# Patient Record
Sex: MALE | Race: WHITE | NOT HISPANIC OR LATINO | Employment: OTHER | URBAN - METROPOLITAN AREA
[De-identification: names, ages, dates, MRNs, and addresses within clinical notes are randomized per-mention and may not be internally consistent; named-entity substitution may affect disease eponyms.]

---

## 2018-05-17 LAB — HBA1C MFR BLD HPLC: 6 %

## 2018-05-23 ENCOUNTER — OFFICE VISIT (OUTPATIENT)
Dept: ENDOCRINOLOGY | Facility: HOSPITAL | Age: 83
End: 2018-05-23
Payer: COMMERCIAL

## 2018-05-23 VITALS
SYSTOLIC BLOOD PRESSURE: 122 MMHG | WEIGHT: 222.4 LBS | HEIGHT: 71 IN | HEART RATE: 66 BPM | DIASTOLIC BLOOD PRESSURE: 80 MMHG | BODY MASS INDEX: 31.14 KG/M2

## 2018-05-23 DIAGNOSIS — E78.5 HYPERLIPIDEMIA, UNSPECIFIED HYPERLIPIDEMIA TYPE: ICD-10-CM

## 2018-05-23 DIAGNOSIS — E03.9 HYPOTHYROIDISM, UNSPECIFIED TYPE: Primary | ICD-10-CM

## 2018-05-23 DIAGNOSIS — R73.03 PREDIABETES: ICD-10-CM

## 2018-05-23 DIAGNOSIS — I10 HYPERTENSION, UNSPECIFIED TYPE: ICD-10-CM

## 2018-05-23 PROCEDURE — 99204 OFFICE O/P NEW MOD 45 MIN: CPT | Performed by: INTERNAL MEDICINE

## 2018-05-23 RX ORDER — HYDROCHLOROTHIAZIDE 25 MG/1
25 TABLET ORAL DAILY
COMMUNITY

## 2018-05-23 RX ORDER — FINASTERIDE 5 MG/1
5 TABLET, FILM COATED ORAL DAILY
Refills: 0 | COMMUNITY
Start: 2018-03-08

## 2018-05-23 RX ORDER — VERAPAMIL HYDROCHLORIDE 120 MG/1
60 TABLET, FILM COATED ORAL DAILY
Refills: 0 | COMMUNITY
Start: 2018-05-04

## 2018-05-23 RX ORDER — COLCHICINE 0.6 MG/1
0.6 TABLET ORAL DAILY
Refills: 0 | COMMUNITY
Start: 2018-04-27

## 2018-05-23 RX ORDER — PRAVASTATIN SODIUM 20 MG
20 TABLET ORAL DAILY
Refills: 0 | COMMUNITY
Start: 2018-03-09

## 2018-05-23 RX ORDER — MELATONIN
2000 DAILY
COMMUNITY

## 2018-05-23 RX ORDER — TIMOLOL MALEATE 5 MG/ML
1 SOLUTION/ DROPS OPHTHALMIC 2 TIMES DAILY
Refills: 1 | COMMUNITY
Start: 2018-03-01

## 2018-05-23 RX ORDER — DOXAZOSIN MESYLATE 4 MG/1
8 TABLET ORAL
Refills: 0 | COMMUNITY
Start: 2018-05-08

## 2018-05-23 NOTE — LETTER
May 23, 2018     Ira Rochaanastacia  [address]    Patient: Elayne Gilford   YOB: 1930   Date of Visit: 5/23/2018       Dear Dr Khanh Waters: Thank you for referring Elayne Gilford to me for evaluation  Below are my notes for this consultation  If you have questions, please do not hesitate to call me  I look forward to following your patient along with you  Sincerely,        Chris Galvez MD        CC: MD Chris Pérez MD  5/23/2018 12:39 PM  Sign at close encounter  5/23/2018    Assessment/Plan      Diagnoses and all orders for this visit:    Hypothyroidism, unspecified type  -     T4, free Lab Collect; Future  -     TSH, 3rd generation Lab Collect; Future    Hypertension, unspecified type    Prediabetes  -     HEMOGLOBIN A1C W/ EAG ESTIMATION Lab Collect; Future  -     Comprehensive metabolic panel Lab Collect; Future  -     Microalbumin / creatinine urine ratio Lab Collect; Future  -     T4, free Lab Collect; Future  -     TSH, 3rd generation Lab Collect; Future    Hyperlipidemia, unspecified hyperlipidemia type    Other orders  -     verapamil (CALAN) 120 mg tablet; Take 60 mg by mouth daily    -     pravastatin (PRAVACHOL) 20 mg tablet; Take 20 mg by mouth daily  -     doxazosin (CARDURA) 4 mg tablet; Take 8 mg by mouth daily at bedtime    -     colchicine (COLCRYS) 0 6 mg tablet; Take 0 6 mg by mouth daily    -     finasteride (PROSCAR) 5 mg tablet; Take 5 mg by mouth daily    -     timolol (TIMOPTIC) 0 5 % ophthalmic solution;   -     cholecalciferol (VITAMIN D3) 1,000 units tablet; Take 2,000 Units by mouth daily  -     hydrochlorothiazide (HYDRODIURIL) 25 mg tablet; Take 25 mg by mouth daily prn         Assessment/Plan:  1  Prediabetes  His most recent hemoglobin A1c of 6% does show he has prediabetes and not diabetes  I reassured him about regarding this    He needs to continue to work on limiting and eliminating sweets and carbohydrates in his diet to help him lose some weight  I will follow the hemoglobin A1c over time  He can continue to work on diet alone for now  2   Hypothyroidism  His TSH is 7 5 signifying biochemical hypothyroidism  He is antibody negative  He does not have a wish to treat this as yet did see has no symptoms  It is reasonable to wait until his TSH is over 10 as long as his free T4 is normal before treating this hypothyroidism  3   Hyperlipidemia  He is following with Dr Ronnie Rodriguez for that and is on pravastatin 20 mg daily  4   Hypertension  His blood pressure is currently under good control on his current dose of Cardura and Calan  I will have him follow up in 6 months with preceding hemoglobin A1c, CMP, TSH, free T4, and urine microalbumin to creatinine ratio  CC: Diabetes Consult    History of Present Illness     HPI: Megan Mcdonald is a 80y o  year old male with type 2 diabetes in recent years  His blood sugars have been elevated and no diagnosis of diabetes as yet  He is on diet at home  He denies any polyuria, polydipsia, polyphagia, and blurry vision  He admits to once or twice a night nocturia  He denies chest pain or shortness of breath  He denies numbness or tingling of the feet  He denies neuropathy, nephropathy, retinopathy, heart attack, stroke and claudication but does admit to none  Hypoglycemic episodes: No never  H/o of hypoglycemia causing hospitalization or intervention such as glucagon injection  or ambulance call  No   Hypoglycemia symptoms: never  had one  Treatment of hypoglycemia: drinks juice  Glucagon:No   Medic alert tag: recommended,Yes  The patient's last eye exam was in about 6 months a go  Due next month for follow up  No retinopathy in eyes     The patient's last foot exam was in regularly comes to the home to cut toenails  Last A1C was 6 0% on 5/17/18  Blood Sugar/Glucometer/Pump/CGM review: not done    Before breakfast: Before lunch:   Before dinner:   Bedtime:     Review of Systems   Constitutional: Negative for diaphoresis, fatigue and unexpected weight change  Trying to lose weight  HENT: Negative for hearing loss and tinnitus  Eyes: Negative for visual disturbance  Wears glasses for bifocals  Respiratory: Negative for chest tightness and shortness of breath  Cardiovascular: Negative for chest pain, palpitations and leg swelling  Saw Dr Thien Hair 2-3 weeks ago  Had recent holter monitor  To get a Echo soon  Gastrointestinal: Negative for abdominal pain, constipation, diarrhea and nausea  Endocrine: Negative for cold intolerance, heat intolerance, polydipsia, polyphagia and polyuria  Had polyuria in the past, but new medicine from urology has helped  Nocturia 1-2 times a night  Musculoskeletal: Positive for arthralgias  Negative for back pain  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, Mantle cell  Bilateral knee pain  Skin: Negative for rash and wound  Neurological: Negative for dizziness, tremors, light-headedness, numbness and headaches  Psychiatric/Behavioral: Negative for dysphoric mood and sleep disturbance  The patient is not nervous/anxious  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         Historical Information   Past Medical History:   Diagnosis Date    BPH (benign prostatic hyperplasia)     Gout     Lymphoma ([facility] [address] )     mantle cell     Past Surgical History:   Procedure Laterality Date    CARPAL TUNNEL RELEASE Bilateral     INGUINAL HERNIA REPAIR Bilateral     X 3, some with mesh placement    PORTACATH PLACEMENT Left      Social History   History   Alcohol Use No     History   Drug Use No     History   Smoking Status    Former Smoker    Packs/day: 1 00    Years: 20 00    Types: Cigarettes    Quit date: 1970   Smokeless Tobacco    Never Used     Family History:   Family History   Problem Relation Age of Onset    Heart disease Mother     Heart attack Mother     Emphysema Father     Obesity Sister     No Known Problems Daughter     No Known Problems Daughter        Meds/Allergies   Current Outpatient Prescriptions   Medication Sig Dispense Refill    cholecalciferol (VITAMIN D3) 1,000 units tablet Take 2,000 Units by mouth daily      colchicine (COLCRYS) 0 6 mg tablet Take 0 6 mg by mouth daily    0    doxazosin (CARDURA) 4 mg tablet Take 8 mg by mouth daily at bedtime    0    finasteride (PROSCAR) 5 mg tablet Take 5 mg by mouth daily    0    hydrochlorothiazide (HYDRODIURIL) 25 mg tablet Take 25 mg by mouth daily prn       pravastatin (PRAVACHOL) 20 mg tablet Take 20 mg by mouth daily  0    timolol (TIMOPTIC) 0 5 % ophthalmic solution   1    verapamil (CALAN) 120 mg tablet Take 60 mg by mouth daily    0     No current facility-administered medications for this visit  Allergies   Allergen Reactions    Neulasta [Pegfilgrastim]     Rituxan [Rituximab]        Objective   Vitals: Blood pressure 122/80, pulse 66, height 5' 10 5" (1 791 m), weight 101 kg (222 lb 6 4 oz)  Invasive Devices          No matching active lines, drains, or airways          Physical Exam   Constitutional: He is oriented to person, place, and time  He appears well-developed and well-nourished  HENT:   Head: Normocephalic and atraumatic  Eyes: Conjunctivae and EOM are normal  Pupils are equal, round, and reactive to light  No lid lag, stare, proptosis, or periorbital edema  Neck: Normal range of motion  Neck supple  No thyromegaly present  No carotid bruits  Cardiovascular: Normal rate, regular rhythm, normal heart sounds and intact distal pulses  No murmur heard  Pulmonary/Chest: Effort normal and breath sounds normal  He has no wheezes  Abdominal: Soft  Bowel sounds are normal  There is no tenderness  Musculoskeletal: Normal range of motion  He exhibits no edema or deformity  2+ edema of the bilateral lower extremities    No tremor of the outstretched hands  No spinous process tenderness  No CVAT  Lymphadenopathy:     He has no cervical adenopathy  Neurological: He is alert and oriented to person, place, and time  He has normal reflexes  Skin: Skin is warm and dry  No rash noted  No erythema  Vitals reviewed  The history was obtained from the review of the chart and from the patient  Lab Results:   Blood work on 05/17/2018 showed hemoglobin A1c of 6%  CMP showed a glucose of 114 fasting but was otherwise normal   TSH is elevated at 7 5  Free T4 is normal at 1 04  Thyroid peroxidase antibodies were normal at 27  No results found for this or any previous visit (from the past 06151 hour(s))        Future Appointments  Date Time Provider Radha Silva   11/27/2018 11:15 AM WILBER Degroot [facility]

## 2018-05-23 NOTE — PROGRESS NOTES
5/23/2018    Assessment/Plan      Diagnoses and all orders for this visit:    Hypothyroidism, unspecified type  -     T4, free Lab Collect; Future  -     TSH, 3rd generation Lab Collect; Future    Hypertension, unspecified type    Prediabetes  -     HEMOGLOBIN A1C W/ EAG ESTIMATION Lab Collect; Future  -     Comprehensive metabolic panel Lab Collect; Future  -     Microalbumin / creatinine urine ratio Lab Collect; Future  -     T4, free Lab Collect; Future  -     TSH, 3rd generation Lab Collect; Future    Hyperlipidemia, unspecified hyperlipidemia type    Other orders  -     verapamil (CALAN) 120 mg tablet; Take 60 mg by mouth daily    -     pravastatin (PRAVACHOL) 20 mg tablet; Take 20 mg by mouth daily  -     doxazosin (CARDURA) 4 mg tablet; Take 8 mg by mouth daily at bedtime    -     colchicine (COLCRYS) 0 6 mg tablet; Take 0 6 mg by mouth daily    -     finasteride (PROSCAR) 5 mg tablet; Take 5 mg by mouth daily    -     timolol (TIMOPTIC) 0 5 % ophthalmic solution;   -     cholecalciferol (VITAMIN D3) 1,000 units tablet; Take 2,000 Units by mouth daily  -     hydrochlorothiazide (HYDRODIURIL) 25 mg tablet; Take 25 mg by mouth daily prn         Assessment/Plan:  1  Prediabetes  His most recent hemoglobin A1c of 6% does show he has prediabetes and not diabetes  I reassured him about regarding this  He needs to continue to work on limiting and eliminating sweets and carbohydrates in his diet to help him lose some weight  I will follow the hemoglobin A1c over time  He can continue to work on diet alone for now  2   Hypothyroidism  His TSH is 7 5 signifying biochemical hypothyroidism  He is antibody negative  He does not have a wish to treat this as yet did see has no symptoms  It is reasonable to wait until his TSH is over 10 as long as his free T4 is normal before treating this hypothyroidism  3   Hyperlipidemia  He is following with Dr Bonnie Del Valle for that and is on pravastatin 20 mg daily    4  Hypertension  His blood pressure is currently under good control on his current dose of Cardura and Calan  I will have him follow up in 6 months with preceding hemoglobin A1c, CMP, TSH, free T4, and urine microalbumin to creatinine ratio  CC: Diabetes Consult    History of Present Illness     HPI: Elva Cardenas is a 80y o  year old male with type 2 diabetes in recent years  His blood sugars have been elevated and no diagnosis of diabetes as yet  He is on diet at home  He denies any polyuria, polydipsia, polyphagia, and blurry vision  He admits to once or twice a night nocturia  He denies chest pain or shortness of breath  He denies numbness or tingling of the feet  He denies neuropathy, nephropathy, retinopathy, heart attack, stroke and claudication but does admit to none  Hypoglycemic episodes: No never  H/o of hypoglycemia causing hospitalization or intervention such as glucagon injection  or ambulance call  No   Hypoglycemia symptoms: never  had one  Treatment of hypoglycemia: drinks juice  Glucagon:No   Medic alert tag: recommended,Yes  The patient's last eye exam was in about 6 months a go  Due next month for follow up  No retinopathy in eyes     The patient's last foot exam was in regularly comes to the home to cut toenails  Last A1C was 6 0% on 5/17/18  Blood Sugar/Glucometer/Pump/CGM review: not done  Before breakfast:   Before lunch:   Before dinner:   Bedtime:     Review of Systems   Constitutional: Negative for diaphoresis, fatigue and unexpected weight change  Trying to lose weight  HENT: Negative for hearing loss and tinnitus  Eyes: Negative for visual disturbance  Wears glasses for bifocals  Respiratory: Negative for chest tightness and shortness of breath  Cardiovascular: Negative for chest pain, palpitations and leg swelling  Saw Dr Maribell Lee 2-3 weeks ago  Had recent holter monitor  To get a Echo soon     Gastrointestinal: Negative for abdominal pain, constipation, diarrhea and nausea  Endocrine: Negative for cold intolerance, heat intolerance, polydipsia, polyphagia and polyuria  Had polyuria in the past, but new medicine from urology has helped  Nocturia 1-2 times a night  Musculoskeletal: Positive for arthralgias  Negative for back pain  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, Mantle cell  Bilateral knee pain  Skin: Negative for rash and wound  Neurological: Negative for dizziness, tremors, light-headedness, numbness and headaches  Psychiatric/Behavioral: Negative for dysphoric mood and sleep disturbance  The patient is not nervous/anxious  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         Historical Information   Past Medical History:   Diagnosis Date    BPH (benign prostatic hyperplasia)     Gout     Lymphoma ([facility] [address] )     mantle cell     Past Surgical History:   Procedure Laterality Date    CARPAL TUNNEL RELEASE Bilateral     INGUINAL HERNIA REPAIR Bilateral     X 3, some with mesh placement    PORTACATH PLACEMENT Left      Social History   History   Alcohol Use No     History   Drug Use No     History   Smoking Status    Former Smoker    Packs/day: 1 00    Years: 20 00    Types: Cigarettes    Quit date: 1970   Smokeless Tobacco    Never Used     Family History:   Family History   Problem Relation Age of Onset    Heart disease Mother     Heart attack Mother     Emphysema Father     Obesity Sister     No Known Problems Daughter     No Known Problems Daughter        Meds/Allergies   Current Outpatient Prescriptions   Medication Sig Dispense Refill    cholecalciferol (VITAMIN D3) 1,000 units tablet Take 2,000 Units by mouth daily      colchicine (COLCRYS) 0 6 mg tablet Take 0 6 mg by mouth daily    0    doxazosin (CARDURA) 4 mg tablet Take 8 mg by mouth daily at bedtime    0    finasteride (PROSCAR) 5 mg tablet Take 5 mg by mouth daily    0    hydrochlorothiazide (HYDRODIURIL) 25 mg tablet Take 25 mg by mouth daily prn       pravastatin (PRAVACHOL) 20 mg tablet Take 20 mg by mouth daily  0    timolol (TIMOPTIC) 0 5 % ophthalmic solution   1    verapamil (CALAN) 120 mg tablet Take 60 mg by mouth daily    0     No current facility-administered medications for this visit  Allergies   Allergen Reactions    Neulasta [Pegfilgrastim]     Rituxan [Rituximab]        Objective   Vitals: Blood pressure 122/80, pulse 66, height 5' 10 5" (1 791 m), weight 101 kg (222 lb 6 4 oz)  Invasive Devices          No matching active lines, drains, or airways          Physical Exam   Constitutional: He is oriented to person, place, and time  He appears well-developed and well-nourished  HENT:   Head: Normocephalic and atraumatic  Eyes: Conjunctivae and EOM are normal  Pupils are equal, round, and reactive to light  No lid lag, stare, proptosis, or periorbital edema  Neck: Normal range of motion  Neck supple  No thyromegaly present  No carotid bruits  Cardiovascular: Normal rate, regular rhythm, normal heart sounds and intact distal pulses  No murmur heard  Pulmonary/Chest: Effort normal and breath sounds normal  He has no wheezes  Abdominal: Soft  Bowel sounds are normal  There is no tenderness  Musculoskeletal: Normal range of motion  He exhibits no edema or deformity  2+ edema of the bilateral lower extremities  No tremor of the outstretched hands  No spinous process tenderness  No CVAT  Lymphadenopathy:     He has no cervical adenopathy  Neurological: He is alert and oriented to person, place, and time  He has normal reflexes  Skin: Skin is warm and dry  No rash noted  No erythema  Vitals reviewed  The history was obtained from the review of the chart and from the patient  Lab Results:   Blood work on 05/17/2018 showed hemoglobin A1c of 6%  CMP showed a glucose of 114 fasting but was otherwise normal   TSH is elevated at 7 5  Free T4 is normal at 1 04    Thyroid peroxidase antibodies were normal at 27  No results found for this or any previous visit (from the past 85538 hour(s))        Future Appointments  Date Time Provider Rdaha Silva   11/27/2018 11:15 AM WILBER Sapp [facility]

## 2018-05-23 NOTE — PATIENT INSTRUCTIONS
Hgba1c 6 0% which shows Prediabetes, not diabetes, but need to limit the sweets  Thyroid is a bit underactive  You can treat it now or wait until level over 10  Follow up in 6 months with blood work  Hypothyroidism   WHAT YOU NEED TO KNOW:   What is hypothyroidism? Hypothyroidism is a condition that develops when the thyroid gland does not make enough thyroid hormone  Thyroid hormones help control body temperature, heart rate, growth, and weight  What causes hypothyroidism? If you have a family member with hypothyroidism, your risk is increased  Any of the following can cause hypothyroidism:  · Autoimmune disease, such as inflammation of your thyroid, or Hashimoto disease    · Surgery, radiation therapy, or medicines such as lithium, sedatives, or narcotics    · Thyroid cancer or viral infection    · Low iodine levels  What are the signs and symptoms of hypothyroidism? The signs and symptoms may develop slowly, sometimes over several years  · Exhaustion    · Sensitivity to cold    · Headaches or decreased concentration    · Muscle aches or weakness    · Constipation     · Dry, flaky skin or brittle nails    · Thinning hair    · Heavy or irregular monthly periods    · Depression or irritability  How is hypothyroidism diagnosed? Your healthcare provider will ask about your symptoms and what medicines you take  He will ask about your medical history and if anyone in your family has hypothyroidism  A blood test will show your thyroid hormone level  How is hypothyroidism treated? Thyroid hormone replacement medicine may bring your thyroid hormone level back to normal  Ask your healthcare provider for more information on other medicines you may need  Call 911 for any of the following:   · You have sudden chest pain or shortness of breath  · You have a seizure  · You feel like you are going to faint  When should I seek immediate care? · You have diarrhea, tremors, or trouble sleeping      · Your legs, ankles, or feet are swollen  When should I contact my healthcare provider? · You have a fever  · You have chills, a cough, or feel weak and achy  · You have pain and swelling in your muscles and joints  · Your skin is itchy, swollen, or you have a rash  · Your signs and symptoms return or get worse, even after treatment  · You have questions or concerns about your condition or care  CARE AGREEMENT:   You have the right to help plan your care  Learn about your health condition and how it may be treated  Discuss treatment options with your caregivers to decide what care you want to receive  You always have the right to refuse treatment  The above information is an  only  It is not intended as medical advice for individual conditions or treatments  Talk to your doctor, nurse or pharmacist before following any medical regimen to see if it is safe and effective for you  © 2017 [address] [employer] Information is for End User's use only and may not be sold, redistributed or otherwise used for commercial purposes  All illustrations and images included in CareNotes® are the copyrighted property of [employer]  or [address]

## 2018-11-22 LAB
ALBUMIN SERPL-MCNC: 3.9 G/DL (ref 3.5–4.7)
ALBUMIN/CREAT UR: 452.1 MG/G CREAT (ref 0–30)
ALBUMIN/GLOB SERPL: 1.3 {RATIO} (ref 1.2–2.2)
ALP SERPL-CCNC: 48 IU/L (ref 39–117)
ALT SERPL-CCNC: 24 IU/L (ref 0–44)
AST SERPL-CCNC: 26 IU/L (ref 0–40)
BILIRUB SERPL-MCNC: 1.1 MG/DL (ref 0–1.2)
BUN SERPL-MCNC: 15 MG/DL (ref 8–27)
BUN/CREAT SERPL: 14 (ref 10–24)
CALCIUM SERPL-MCNC: 9.4 MG/DL (ref 8.6–10.2)
CHLORIDE SERPL-SCNC: 99 MMOL/L (ref 96–106)
CO2 SERPL-SCNC: 27 MMOL/L (ref 20–29)
CREAT SERPL-MCNC: 1.08 MG/DL (ref 0.76–1.27)
CREAT UR-MCNC: 60.1 MG/DL
EST. AVERAGE GLUCOSE BLD GHB EST-MCNC: 134 MG/DL
GLOBULIN SER-MCNC: 3.1 G/DL (ref 1.5–4.5)
GLUCOSE SERPL-MCNC: 108 MG/DL (ref 65–99)
HBA1C MFR BLD: 6.3 % (ref 4.8–5.6)
LABCORP COMMENT: NORMAL
MICROALBUMIN UR-MCNC: 271.7 UG/ML
POTASSIUM SERPL-SCNC: 3.8 MMOL/L (ref 3.5–5.2)
PROT SERPL-MCNC: 7 G/DL (ref 6–8.5)
SL AMB EGFR AFRICAN AMERICAN: 71 ML/MIN/1.73
SL AMB EGFR NON AFRICAN AMERICAN: 61 ML/MIN/1.73
SODIUM SERPL-SCNC: 142 MMOL/L (ref 134–144)
T4 FREE SERPL-MCNC: 1.08 NG/DL (ref 0.82–1.77)
TSH SERPL DL<=0.005 MIU/L-ACNC: 7.13 UIU/ML (ref 0.45–4.5)

## 2018-12-03 ENCOUNTER — OFFICE VISIT (OUTPATIENT)
Dept: ENDOCRINOLOGY | Facility: HOSPITAL | Age: 83
End: 2018-12-03
Payer: COMMERCIAL

## 2018-12-03 VITALS
HEIGHT: 71 IN | SYSTOLIC BLOOD PRESSURE: 146 MMHG | BODY MASS INDEX: 29.82 KG/M2 | DIASTOLIC BLOOD PRESSURE: 80 MMHG | HEART RATE: 70 BPM | WEIGHT: 213 LBS

## 2018-12-03 DIAGNOSIS — E03.9 ACQUIRED HYPOTHYROIDISM: ICD-10-CM

## 2018-12-03 DIAGNOSIS — R73.03 PREDIABETES: Primary | ICD-10-CM

## 2018-12-03 PROCEDURE — 99213 OFFICE O/P EST LOW 20 MIN: CPT | Performed by: INTERNAL MEDICINE

## 2018-12-03 RX ORDER — GABAPENTIN 300 MG/1
100 CAPSULE ORAL
COMMUNITY

## 2018-12-03 RX ORDER — PREDNISOLONE ACETATE 10 MG/ML
1 SUSPENSION/ DROPS OPHTHALMIC 2 TIMES DAILY
COMMUNITY

## 2018-12-03 RX ORDER — GABAPENTIN 100 MG/1
100 CAPSULE ORAL 2 TIMES DAILY
COMMUNITY

## 2018-12-03 NOTE — PROGRESS NOTES
12/3/2018    Assessment/Plan      Diagnoses and all orders for this visit:    Prediabetes  -     HEMOGLOBIN A1C W/ EAG ESTIMATION Lab Collect; Future  -     Comprehensive metabolic panel Lab Collect; Future  -     Microalbumin / creatinine urine ratio Lab Collect; Future    Acquired hypothyroidism  -     TSH, 3rd generation Lab Collect; Future  -     T4, free Lab Collect; Future  -     Thyroid Antibodies Panel Lab Collect; Future    Other orders  -     apixaban (ELIQUIS) 2 5 mg; Take by mouth 2 (two) times a day  -     prednisoLONE acetate (PRED FORTE) 1 % ophthalmic suspension; Administer 1 drop into the left eye 2 (two) times a day  -     Brinzolamide-Brimonidine (SIMBRINZA) 1-0 2 % SUSP; Apply 1 drop to eye Left eye 4 times a day  -     Artificial Tear Ointment (EYE LUBRICANT OP); Apply 1 drop to eye As needed in left eye  -     immune globulin, human, (GAMMAGARD) 5 g; Infuse into a venous catheter Every 4 weeks  -     gabapentin (NEURONTIN) 100 mg capsule; Take 100 mg by mouth 2 (two) times a day Taken in am and in the afternoon  -     gabapentin (NEURONTIN) 300 mg capsule; Take 100 mg by mouth daily at bedtime        Assessment/Plan:  1  Subclinical Hypothyroidism  His most recent TSH is still slightly above normal at 7 1  This is slightly improved from last visit  He is essentially asymptomatic other than some mild constipation  At this point, he does not want to start any new medicines and it is not absolutely necessary to start thyroid hormone until his TSH is over 10  His recent onset of atrial fibrillation is not related to his elevated TSH is atrial fibrillation is associated with hyperthyroidism not hypothyroidism  I reassured him and his daughter that he does not need to start thyroid hormone we can continue to followed over time  2   Prediabetes    His hemoglobin A1c has continued to climb and is now up to 6 3% some this may be as a result of his recent shingles episode and symptoms and treatments thereof  He also has had an elevation microalbuminuria which is not typical for people with prediabetes  He may need an ACE-inhibitor added but I will repeat his urine microalbumin next visit as his creatinine is stable and make that decision then  I did talk with him again at length and his daughter about decreasing the sweets in his diet and carbohydrates  I have asked him to follow up in 6 months with preceding hemoglobin A1c, CMP, TSH, and free T4 along with urine microalbumin to creatinine ratio  CC: Hypothyroid and Prediabetes Followup    History of Present Illness     HPI: Geovanni Keita is a 80y o  year old male with history of hypothyroidism and prediabetes  He has hypothyroidism with mildly elevated TSH is and has decided not to treat this if he does not absolutely have to  He denies heat or cold intolerance, diarrhea, palpitation, tremors, anxiety or depression  He denies fatigue  He has some constipation  He has had more stress on himself over the last 6 months with his wife in place insisted living and his recent shingles outbreak which was difficult to treat  He has actually lost 9 lb since last visit  He has some dry patches of his skin and winter dry skin  He denies brittle nails  He has no diplopia  He has no difficulties with swallowing or compressive thyroid symptoms  Had shingles late June 2018 on left face with eye involvement  Still some symptoms on the left face and blurry vision  Now off antiviral    He is still on gabapentin  Saw DR Macho Ramirez last week  Now intermittent a fib  Now on eliquis  May need cardioversion  He says his diet is good and had lost weight  Has given up candy bars  He denies polyuria, polydipsia, polyphagia  He has once a night nocturia  He denies numbness or tingling of the feet  He denies blurry vision  He denies chest pain or shortness of breath  Review of Systems   Constitutional: Positive for unexpected weight change  Negative for fatigue  9 lbs weight loss since may 2018  HENT: Negative for trouble swallowing  Eyes: Negative for visual disturbance  No diplopia  Respiratory: Negative for chest tightness and shortness of breath  Cardiovascular: Negative for chest pain and palpitations  Gastrointestinal: Positive for constipation  Negative for abdominal pain, diarrhea and nausea  A bit on the constipated side  Endocrine: Negative for cold intolerance, heat intolerance, polydipsia, polyphagia and polyuria  Nocturia once a night  Skin: Negative for wound  Winter dry skin and dry skin patches  No brittle nails or hair loss  Sees podiatry regularly  Neurological: Negative for tremors and numbness  Psychiatric/Behavioral: Negative for dysphoric mood and sleep disturbance  The patient is not nervous/anxious  Wife in assisted living at present, stress with placing her in June 2018  Silvia Paz        Historical Information   Past Medical History:   Diagnosis Date    BPH (benign prostatic hyperplasia)     Gout     Lymphoma (HCC)     mantle cell    Paroxysmal atrial fibrillation ([address] )     Shingles 06/2018    left face and eye      Past Surgical History:   Procedure Laterality Date    CARPAL TUNNEL RELEASE Bilateral     INGUINAL HERNIA REPAIR Bilateral     X 3, some with mesh placement    PORTACATH PLACEMENT Left      Social History   History   Alcohol Use No     History   Drug Use No     History   Smoking Status    Former Smoker    Packs/day: 1 00    Years: 20 00    Types: Cigarettes    Quit date: 1970   Smokeless Tobacco    Never Used     Family History:   Family History   Problem Relation Age of Onset    Heart disease Mother     Heart attack Mother     Emphysema Father     Obesity Sister     No Known Problems Daughter     No Known Problems Daughter        Meds/Allergies   Current Outpatient Prescriptions   Medication Sig Dispense Refill    apixaban (ELIQUIS) 2 5 mg Take by mouth 2 (two) times a day      Artificial Tear Ointment (EYE LUBRICANT OP) Apply 1 drop to eye As needed in left eye      Brinzolamide-Brimonidine (SIMBRINZA) 1-0 2 % SUSP Apply 1 drop to eye Left eye 4 times a day      cholecalciferol (VITAMIN D3) 1,000 units tablet Take 2,000 Units by mouth daily      colchicine (COLCRYS) 0 6 mg tablet Take 0 6 mg by mouth daily    0    doxazosin (CARDURA) 4 mg tablet Take 8 mg by mouth daily at bedtime    0    finasteride (PROSCAR) 5 mg tablet Take 5 mg by mouth daily    0    gabapentin (NEURONTIN) 100 mg capsule Take 100 mg by mouth 2 (two) times a day Taken in am and in the afternoon      gabapentin (NEURONTIN) 300 mg capsule Take 100 mg by mouth daily at bedtime      hydrochlorothiazide (HYDRODIURIL) 25 mg tablet Take 25 mg by mouth daily prn       immune globulin, human, (GAMMAGARD) 5 g Infuse into a venous catheter Every 4 weeks      pravastatin (PRAVACHOL) 20 mg tablet Take 20 mg by mouth daily  0    prednisoLONE acetate (PRED FORTE) 1 % ophthalmic suspension Administer 1 drop into the left eye 2 (two) times a day      timolol (TIMOPTIC) 0 5 % ophthalmic solution Administer 1 drop to both eyes 2 (two) times a day    1    verapamil (CALAN) 120 mg tablet Take 60 mg by mouth daily    0     No current facility-administered medications for this visit  Allergies   Allergen Reactions    Neulasta [Pegfilgrastim]     Rituxan [Rituximab]        Objective   Vitals: Blood pressure 146/80, pulse 70, height 5' 10 5" (1 791 m), weight 96 6 kg (213 lb)  Invasive Devices          No matching active lines, drains, or airways          Physical Exam   Constitutional: He is oriented to person, place, and time  He appears well-developed and well-nourished  HENT:   Head: Normocephalic and atraumatic  Eyes: Pupils are equal, round, and reactive to light  Conjunctivae and EOM are normal    No lid lag, stare, proptosis, or periorbital edema     Neck: Normal range of motion  Neck supple  No thyromegaly present  Thyroid normal in size  No palpable thyroid nodules  No bruits over the carotids  Cardiovascular: Normal rate, regular rhythm and normal heart sounds  No murmur heard  Pulmonary/Chest: Effort normal and breath sounds normal  He has no wheezes  Musculoskeletal: Normal range of motion  He exhibits edema  He exhibits no deformity  1+ edema of the bilateral lower extremities  No tremor of the outstretched hands  Lymphadenopathy:     He has no cervical adenopathy  Neurological: He is alert and oriented to person, place, and time  He has normal reflexes  Skin: Skin is warm and dry  No rash noted  No erythema  Vitals reviewed  The history was obtained from the review of the chart and from the patient and daughter  Lab Results:       Recent Results (from the past 91682 hour(s))   Comprehensive metabolic panel    Collection Time: 11/21/18 11:49 AM   Result Value Ref Range    Glucose, Random 108 (H) 65 - 99 mg/dL    BUN 15 8 - 27 mg/dL    Creatinine 1 08 0 76 - 1 27 mg/dL    eGFR Non  61 >59 mL/min/1 73    SL AMB EGFR AFRICAN AMERICAN 71 >59 mL/min/1 73    SL AMB BUN/CREATININE RATIO 14 10 - 24    Sodium 142 134 - 144 mmol/L    Potassium 3 8 3 5 - 5 2 mmol/L    Chloride 99 96 - 106 mmol/L    CO2 27 20 - 29 mmol/L    CALCIUM 9 4 8 6 - 10 2 mg/dL    SL AMB PROTEIN, TOTAL 7 0 6 0 - 8 5 g/dL    Albumin 3 9 3 5 - 4 7 g/dL    Globulin, Total 3 1 1 5 - 4 5 g/dL    Albumin/Globulin Ratio 1 3 1 2 - 2 2    TOTAL BILIRUBIN 1 1 0 0 - 1 2 mg/dL    Alk Phos Isoenzymes 48 39 - 117 IU/L    SL AMB AST 26 0 - 40 IU/L    SL AMB ALT 24 0 - 44 IU/L   Microalbumin / creatinine urine ratio    Collection Time: 11/21/18 11:49 AM   Result Value Ref Range    Creatinine, Urine 60 1 Not Estab  mg/dL    Microalbum  ,U,Random 271 7 Not Estab  ug/mL    Microalb/Creat Ratio 452 1 (H) 0 0 - 30 0 mg/g creat   Hemoglobin A1C    Collection Time: 11/21/18 11:49 AM   Result Value Ref Range    Hemoglobin A1C 6 3 (H) 4 8 - 5 6 %    Estimated Average Glucose 134 mg/dL   T4, free    Collection Time: 11/21/18 11:49 AM   Result Value Ref Range    Free t4 1 08 0 82 - 1 77 ng/dL   TSH, 3rd generation    Collection Time: 11/21/18 11:49 AM   Result Value Ref Range    TSH 7 130 (H) 0 450 - 4 500 uIU/mL   Specimen Status Report    Collection Time: 11/21/18 11:49 AM   Result Value Ref Range    Comment Comment          Future Appointments  Date Time Provider Radha Parrishisti   6/3/2019 2:00 PM Sukh Eubanks MD [facility]

## 2018-12-03 NOTE — PATIENT INSTRUCTIONS
Hgba1c is up to 6 3%  This is higher than before at 6 0%  Make sure to keep working on cutting out sweets and limiting the starches and carbohydrates  Urine test will be rechecked  Thyroid is stable and we can keep following it  It is not necessary to start thyroid medicine at this point  This is having no effect on the a fib  Follow up in 6 months with blood work

## 2018-12-03 NOTE — LETTER
December 3, 2018     [address] [facility]  [address] [facility]    Patient: Eryn Calixto   YOB: 1930   Date of Visit: 12/3/2018       Dear Dr Louann Gomez: Thank you for referring Eryn Calixto to me for evaluation  Below are my notes for this consultation  If you have questions, please do not hesitate to call me  I look forward to following your patient along with you  Sincerely,        Yesika De Paz MD        CC: Maine Pearson MD  12/3/2018  5:50 PM  Sign at close encounter  12/3/2018    Assessment/Plan      Diagnoses and all orders for this visit:    Prediabetes  -     HEMOGLOBIN A1C W/ EAG ESTIMATION Lab Collect; Future  -     Comprehensive metabolic panel Lab Collect; Future  -     Microalbumin / creatinine urine ratio Lab Collect; Future    Acquired hypothyroidism  -     TSH, 3rd generation Lab Collect; Future  -     T4, free Lab Collect; Future  -     Thyroid Antibodies Panel Lab Collect; Future    Other orders  -     apixaban (ELIQUIS) 2 5 mg; Take by mouth 2 (two) times a day  -     prednisoLONE acetate (PRED FORTE) 1 % ophthalmic suspension; Administer 1 drop into the left eye 2 (two) times a day  -     Brinzolamide-Brimonidine (SIMBRINZA) 1-0 2 % SUSP; Apply 1 drop to eye Left eye 4 times a day  -     Artificial Tear Ointment (EYE LUBRICANT OP); Apply 1 drop to eye As needed in left eye  -     immune globulin, human, (GAMMAGARD) 5 g; Infuse into a venous catheter Every 4 weeks  -     gabapentin (NEURONTIN) 100 mg capsule; Take 100 mg by mouth 2 (two) times a day Taken in am and in the afternoon  -     gabapentin (NEURONTIN) 300 mg capsule; Take 100 mg by mouth daily at bedtime        Assessment/Plan:  1  Hypothyroidism  His most recent TSH is still slightly above normal at 7 1  This is slightly improved from last visit  He is essentially asymptomatic other than some mild constipation    At this point, he does not want to start any new medicines and it is not absolutely necessary to start thyroid hormone until his TSH is over 10  His recent onset of atrial fibrillation is not related to his elevated TSH is atrial fibrillation is associated with hyperthyroidism not hypothyroidism  I reassured him and his daughter that he does not need to start thyroid hormone we can continue to followed over time  2   Prediabetes  His hemoglobin A1c has continued to climb and is now up to 6 3% some this may be as a result of his recent shingles episode and symptoms and treatments thereof  He also has had an elevation microalbuminuria which is not typical for people with prediabetes  He may need an ACE-inhibitor added but I will repeat his urine microalbumin next visit as his creatinine is stable and make that decision then  I did talk with him again at length and his daughter about decreasing the sweets in his diet and carbohydrates  I have asked him to follow up in 6 months with preceding hemoglobin A1c, CMP, TSH, and free T4 along with urine microalbumin to creatinine ratio  CC: Hypothyroid and Prediabetes Followup    History of Present Illness     HPI: Arnold Samuel is a 80y o  year old male with history of hypothyroidism and prediabetes  He has hypothyroidism with mildly elevated TSH is and has decided not to treat this if he does not absolutely have to  He denies heat or cold intolerance, diarrhea, palpitation, tremors, anxiety or depression  He denies fatigue  He has some constipation  He has had more stress on himself over the last 6 months with his wife in place insisted living and his recent shingles outbreak which was difficult to treat  He has actually lost 9 lb since last visit  He has some dry patches of his skin and winter dry skin  He denies brittle nails  He has no diplopia  He has no difficulties with swallowing or compressive thyroid symptoms  Had shingles late June 2018 on left face with eye involvement   Still some symptoms on the left face and blurry vision  Now off antiviral    He is still on gabapentin  Saw DR Valorie Hurd last week  Now intermittent a fib  Now on eliquis  May need cardioversion  He says his diet is good and had lost weight  Has given up candy bars  He denies polyuria, polydipsia, polyphagia  He has once a night nocturia  He denies numbness or tingling of the feet  He denies blurry vision  He denies chest pain or shortness of breath  Review of Systems   Constitutional: Positive for unexpected weight change  Negative for fatigue  9 lbs weight loss since may 2018  HENT: Negative for trouble swallowing  Eyes: Negative for visual disturbance  No diplopia  Respiratory: Negative for chest tightness and shortness of breath  Cardiovascular: Negative for chest pain and palpitations  Gastrointestinal: Positive for constipation  Negative for abdominal pain, diarrhea and nausea  A bit on the constipated side  Endocrine: Negative for cold intolerance, heat intolerance, polydipsia, polyphagia and polyuria  Nocturia once a night  Skin: Negative for wound  Winter dry skin and dry skin patches  No brittle nails or hair loss  Sees podiatry regularly  Neurological: Negative for tremors and numbness  Psychiatric/Behavioral: Negative for dysphoric mood and sleep disturbance  The patient is not nervous/anxious  Wife in assisted living at present, stress with placing her in June 2018  Christopher Pavo        Historical Information   Past Medical History:   Diagnosis Date    BPH (benign prostatic hyperplasia)     Gout     Lymphoma (HCC)     mantle cell    Paroxysmal atrial fibrillation ([address] )     Shingles 06/2018    left face and eye      Past Surgical History:   Procedure Laterality Date    CARPAL TUNNEL RELEASE Bilateral     INGUINAL HERNIA REPAIR Bilateral     X 3, some with mesh placement    PORTACATH PLACEMENT Left      Social History   History   Alcohol Use No History   Drug Use No     History   Smoking Status    Former Smoker    Packs/day: 1 00    Years: 20 00    Types: Cigarettes    Quit date: 1970   Smokeless Tobacco    Never Used     Family History:   Family History   Problem Relation Age of Onset    Heart disease Mother     Heart attack Mother     Emphysema Father     Obesity Sister     No Known Problems Daughter     No Known Problems Daughter        Meds/Allergies   Current Outpatient Prescriptions   Medication Sig Dispense Refill    apixaban (ELIQUIS) 2 5 mg Take by mouth 2 (two) times a day      Artificial Tear Ointment (EYE LUBRICANT OP) Apply 1 drop to eye As needed in left eye      Brinzolamide-Brimonidine (SIMBRINZA) 1-0 2 % SUSP Apply 1 drop to eye Left eye 4 times a day      cholecalciferol (VITAMIN D3) 1,000 units tablet Take 2,000 Units by mouth daily      colchicine (COLCRYS) 0 6 mg tablet Take 0 6 mg by mouth daily    0    doxazosin (CARDURA) 4 mg tablet Take 8 mg by mouth daily at bedtime    0    finasteride (PROSCAR) 5 mg tablet Take 5 mg by mouth daily    0    gabapentin (NEURONTIN) 100 mg capsule Take 100 mg by mouth 2 (two) times a day Taken in am and in the afternoon      gabapentin (NEURONTIN) 300 mg capsule Take 100 mg by mouth daily at bedtime      hydrochlorothiazide (HYDRODIURIL) 25 mg tablet Take 25 mg by mouth daily prn       immune globulin, human, (GAMMAGARD) 5 g Infuse into a venous catheter Every 4 weeks      pravastatin (PRAVACHOL) 20 mg tablet Take 20 mg by mouth daily  0    prednisoLONE acetate (PRED FORTE) 1 % ophthalmic suspension Administer 1 drop into the left eye 2 (two) times a day      timolol (TIMOPTIC) 0 5 % ophthalmic solution Administer 1 drop to both eyes 2 (two) times a day    1    verapamil (CALAN) 120 mg tablet Take 60 mg by mouth daily    0     No current facility-administered medications for this visit        Allergies   Allergen Reactions    Neulasta [Pegfilgrastim]     Rituxan [Rituximab]        Objective   Vitals: Blood pressure 146/80, pulse 70, height 5' 10 5" (1 791 m), weight 96 6 kg (213 lb)  Invasive Devices          No matching active lines, drains, or airways          Physical Exam   Constitutional: He is oriented to person, place, and time  He appears well-developed and well-nourished  HENT:   Head: Normocephalic and atraumatic  Eyes: Pupils are equal, round, and reactive to light  Conjunctivae and EOM are normal    No lid lag, stare, proptosis, or periorbital edema  Neck: Normal range of motion  Neck supple  No thyromegaly present  Thyroid normal in size  No palpable thyroid nodules  No bruits over the carotids  Cardiovascular: Normal rate, regular rhythm and normal heart sounds  No murmur heard  Pulmonary/Chest: Effort normal and breath sounds normal  He has no wheezes  Musculoskeletal: Normal range of motion  He exhibits edema  He exhibits no deformity  1+ edema of the bilateral lower extremities  No tremor of the outstretched hands  Lymphadenopathy:     He has no cervical adenopathy  Neurological: He is alert and oriented to person, place, and time  He has normal reflexes  Skin: Skin is warm and dry  No rash noted  No erythema  Vitals reviewed  The history was obtained from the review of the chart and from the patient and daughter      Lab Results:       Recent Results (from the past 54877 hour(s))   Comprehensive metabolic panel    Collection Time: 11/21/18 11:49 AM   Result Value Ref Range    Glucose, Random 108 (H) 65 - 99 mg/dL    BUN 15 8 - 27 mg/dL    Creatinine 1 08 0 76 - 1 27 mg/dL    eGFR Non  61 >59 mL/min/1 73    SL AMB EGFR AFRICAN AMERICAN 71 >59 mL/min/1 73    SL AMB BUN/CREATININE RATIO 14 10 - 24    Sodium 142 134 - 144 mmol/L    Potassium 3 8 3 5 - 5 2 mmol/L    Chloride 99 96 - 106 mmol/L    CO2 27 20 - 29 mmol/L    CALCIUM 9 4 8 6 - 10 2 mg/dL    SL AMB PROTEIN, TOTAL 7 0 6 0 - 8 5 g/dL    Albumin 3 9 3 5 - 4 7 g/dL    Globulin, Total 3 1 1 5 - 4 5 g/dL    Albumin/Globulin Ratio 1 3 1 2 - 2 2    TOTAL BILIRUBIN 1 1 0 0 - 1 2 mg/dL    Alk Phos Isoenzymes 48 39 - 117 IU/L    SL AMB AST 26 0 - 40 IU/L    SL AMB ALT 24 0 - 44 IU/L   Microalbumin / creatinine urine ratio    Collection Time: 11/21/18 11:49 AM   Result Value Ref Range    Creatinine, Urine 60 1 Not Estab  mg/dL    Microalbum  ,U,Random 271 7 Not Estab  ug/mL    Microalb/Creat Ratio 452 1 (H) 0 0 - 30 0 mg/g creat   Hemoglobin A1C    Collection Time: 11/21/18 11:49 AM   Result Value Ref Range    Hemoglobin A1C 6 3 (H) 4 8 - 5 6 %    Estimated Average Glucose 134 mg/dL   T4, free    Collection Time: 11/21/18 11:49 AM   Result Value Ref Range    Free t4 1 08 0 82 - 1 77 ng/dL   TSH, 3rd generation    Collection Time: 11/21/18 11:49 AM   Result Value Ref Range    TSH 7 130 (H) 0 450 - 4 500 uIU/mL   Specimen Status Report    Collection Time: 11/21/18 11:49 AM   Result Value Ref Range    Comment Comment          Future Appointments  Date Time Provider Radha Shira   6/3/2019 2:00 PM Radha Hensley MD [facility]

## 2019-05-25 LAB
ALBUMIN SERPL-MCNC: 3.6 G/DL (ref 3.5–4.7)
ALBUMIN/CREAT UR: 1014.7 MG/G CREAT (ref 0–30)
ALBUMIN/GLOB SERPL: 1 {RATIO} (ref 1.2–2.2)
ALP SERPL-CCNC: 66 IU/L (ref 39–117)
ALT SERPL-CCNC: 14 IU/L (ref 0–44)
AST SERPL-CCNC: 18 IU/L (ref 0–40)
BILIRUB SERPL-MCNC: 1 MG/DL (ref 0–1.2)
BUN SERPL-MCNC: 22 MG/DL (ref 8–27)
BUN/CREAT SERPL: 18 (ref 10–24)
CALCIUM SERPL-MCNC: 9.7 MG/DL (ref 8.6–10.2)
CHLORIDE SERPL-SCNC: 95 MMOL/L (ref 96–106)
CO2 SERPL-SCNC: 26 MMOL/L (ref 20–29)
CREAT SERPL-MCNC: 1.22 MG/DL (ref 0.76–1.27)
CREAT UR-MCNC: 52.5 MG/DL
EST. AVERAGE GLUCOSE BLD GHB EST-MCNC: 166 MG/DL
GLOBULIN SER-MCNC: 3.7 G/DL (ref 1.5–4.5)
GLUCOSE SERPL-MCNC: 177 MG/DL (ref 65–99)
HBA1C MFR BLD: 7.4 % (ref 4.8–5.6)
MICROALBUMIN UR-MCNC: 532.7 UG/ML
POTASSIUM SERPL-SCNC: 4 MMOL/L (ref 3.5–5.2)
PROT SERPL-MCNC: 7.3 G/DL (ref 6–8.5)
SL AMB EGFR AFRICAN AMERICAN: 61 ML/MIN/1.73
SL AMB EGFR NON AFRICAN AMERICAN: 53 ML/MIN/1.73
SODIUM SERPL-SCNC: 138 MMOL/L (ref 134–144)
T4 FREE SERPL-MCNC: 1.08 NG/DL (ref 0.82–1.77)
THYROGLOB AB SERPL-ACNC: 1.7 IU/ML (ref 0–0.9)
THYROPEROXIDASE AB SERPL-ACNC: 19 IU/ML (ref 0–34)
TSH SERPL DL<=0.005 MIU/L-ACNC: 6.5 UIU/ML (ref 0.45–4.5)

## 2019-06-04 ENCOUNTER — OFFICE VISIT (OUTPATIENT)
Dept: ENDOCRINOLOGY | Facility: HOSPITAL | Age: 84
End: 2019-06-04
Payer: COMMERCIAL

## 2019-06-04 VITALS
WEIGHT: 211.8 LBS | HEIGHT: 71 IN | BODY MASS INDEX: 29.65 KG/M2 | SYSTOLIC BLOOD PRESSURE: 130 MMHG | DIASTOLIC BLOOD PRESSURE: 68 MMHG | HEART RATE: 80 BPM

## 2019-06-04 DIAGNOSIS — R73.03 PREDIABETES: ICD-10-CM

## 2019-06-04 DIAGNOSIS — E55.9 VITAMIN D DEFICIENCY: ICD-10-CM

## 2019-06-04 DIAGNOSIS — I10 HYPERTENSION, UNSPECIFIED TYPE: ICD-10-CM

## 2019-06-04 DIAGNOSIS — E11.65 TYPE 2 DIABETES MELLITUS WITH HYPERGLYCEMIA, WITHOUT LONG-TERM CURRENT USE OF INSULIN (HCC): ICD-10-CM

## 2019-06-04 DIAGNOSIS — E03.9 ACQUIRED HYPOTHYROIDISM: Primary | ICD-10-CM

## 2019-06-04 DIAGNOSIS — E78.5 HYPERLIPIDEMIA, UNSPECIFIED HYPERLIPIDEMIA TYPE: ICD-10-CM

## 2019-06-04 DIAGNOSIS — R80.9 MICROALBUMINURIA: ICD-10-CM

## 2019-06-04 PROCEDURE — 99214 OFFICE O/P EST MOD 30 MIN: CPT | Performed by: NURSE PRACTITIONER

## 2019-06-04 RX ORDER — VALACYCLOVIR HYDROCHLORIDE 500 MG/1
500 TABLET, FILM COATED ORAL DAILY
Refills: 0 | COMMUNITY
Start: 2019-05-15

## 2019-06-04 RX ORDER — AMLODIPINE BESYLATE 5 MG/1
TABLET ORAL
Refills: 0 | COMMUNITY
Start: 2019-04-30

## 2019-06-04 RX ORDER — LISINOPRIL 5 MG/1
5 TABLET ORAL DAILY
Qty: 30 TABLET | Refills: 6 | Status: SHIPPED | OUTPATIENT
Start: 2019-06-04

## 2019-06-10 ENCOUNTER — TELEPHONE (OUTPATIENT)
Dept: ENDOCRINOLOGY | Facility: HOSPITAL | Age: 84
End: 2019-06-10